# Patient Record
Sex: MALE | Race: WHITE
[De-identification: names, ages, dates, MRNs, and addresses within clinical notes are randomized per-mention and may not be internally consistent; named-entity substitution may affect disease eponyms.]

---

## 2018-05-13 ENCOUNTER — HOSPITAL ENCOUNTER (EMERGENCY)
Dept: HOSPITAL 41 - JD.ED | Age: 58
Discharge: HOME | End: 2018-05-13
Payer: COMMERCIAL

## 2018-05-13 DIAGNOSIS — S67.193A: Primary | ICD-10-CM
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
## 2018-05-13 DIAGNOSIS — Z23: ICD-10-CM

## 2018-05-13 DIAGNOSIS — S61.315A: ICD-10-CM

## 2018-05-13 PROCEDURE — 90715 TDAP VACCINE 7 YRS/> IM: CPT

## 2018-05-13 PROCEDURE — 99283 EMERGENCY DEPT VISIT LOW MDM: CPT

## 2018-05-13 PROCEDURE — 12002 RPR S/N/AX/GEN/TRNK2.6-7.5CM: CPT

## 2018-05-13 PROCEDURE — 73140 X-RAY EXAM OF FINGER(S): CPT

## 2018-05-13 PROCEDURE — 90471 IMMUNIZATION ADMIN: CPT

## 2018-05-13 NOTE — CR
Left third finger: Three views centered to the left third finger were 

obtained.

 

Soft tissue injury is seen distally.  No acute fracture, dislocation 

or other bony abnormality is seen.

 

Impression:

1.  Soft tissue injury.

2.  No acute bony abnormality is identified on left third finger 

study.

 

Diagnostic code #2

## 2018-05-13 NOTE — EDM.PDOC
ED HPI GENERAL MEDICAL PROBLEM





- General


Chief Complaint: Upper Extremity Injury/Pain


Stated Complaint: LEFT FINGER LAC


Time Seen by Provider: 05/13/18 11:35


Source of Information: Reports: Patient, Family (spouse)


History Limitations: Reports: No Limitations





- History of Present Illness


INITIAL COMMENTS - FREE TEXT/NARRATIVE: 





57-year-old male presents the ED with an acute injury to his distal left third 

finger. States he was up on the ladder about 3 rungs working on the [employer] 

when the ladder tipped and collapsed. His left finger became entangled in part 

of the ladder that has a hinged component.The hinge clamp down on the ulnar 

aspect of his distal left third finger with resultant large avulsion of skin 

and subcutaneous tissue down to the bone. Patient has lost partially 3 cm in 

length1.5 cm in width of  tissue.he has full range of motion. The ulnar aspect 

of the lateral nail and nail fold has also been damaged and is missing.he has a 

superficial laceration to the radial aspect of the fourth finger as well as 

well as a deep abrasion to the ulnar aspect which will require some 

debridement. Of note he is right-hand dominant.atient can't remember when he 

had his last tetanus toxoid vaccination.


Onset: Today


Onset Date: 05/13/18


Onset Time: 10:45


Duration: Minutes:


Location: Reports: Upper Extremity, Left (left third finger distally.)


Quality: Reports: Ache


Severity: Moderate


Improves with: Reports: None


Worsens with: Reports: None


Context: Reports: Trauma (inched in the hinge on a hinged ladder.)


Associated Symptoms: Reports: No Other Symptoms


Treatments PTA: Reports: Other (see below)


Other Treatments PTA: pressure dressing


  ** Left 3-Middle finger


Pain Score (Numeric/FACES): 5





- Related Data


 Allergies











Allergy/AdvReac Type Severity Reaction Status Date / Time


 


No Known Allergies Allergy   Verified 05/13/18 11:27











Home Meds: 


 Home Meds





Doxycycline [Vibramycin] 100 mg PO BID #24 tab 05/13/18 [Rx]


oxyCODONE HCl/Acetaminophen [Percocet 5-325 mg Tablet] 1 - 2 each PO Q4H PRN #

24 tablet 05/13/18 [Rx]











Past Medical History





- Past Health History


Medical/Surgical History: Denies Medical/Surgical History





Social & Family History





- Tobacco Use


Smoking Status *Q: Never Smoker





- Caffeine Use


Caffeine Use: Reports: Coffee, Tea





- Recreational Drug Use


Recreational Drug Use: No





- Living Situation & Occupation


Occupation: Employed





Review of Systems





- Review of Systems


Review Of Systems: See Below


Constitutional: Reports: No Symptoms


Eyes: Reports: No Symptoms


Ears: Reports: No Symptoms


Nose: Reports: No Symptoms


Mouth/Throat: Reports: No Symptoms


Respiratory: Reports: No Symptoms


Cardiovascular: Reports: No Symptoms


GI/Abdominal: Reports: No Symptoms


Genitourinary: Reports: No Symptoms


Musculoskeletal: Reports: Joint Pain (ometimes knees and back.)


Skin: Reports: No Symptoms


Neurological: Reports: No Symptoms


Psychiatric: Reports: No Symptoms





ED EXAM, GENERAL





- Physical Exam


Exam: See Below


Exam Limited By: No Limitations


General Appearance: Alert, WD/WN, No Apparent Distress


Extremities: Other (examination was limited primarily to his third distal 

finger. Patient has avulsed an area approximately 3 cm in length and 1-1.5 cm 

in width from the ulnar aspect of the distal finger. It has completely avulsed 

his fingernail.There is also a 3 cm laceration on the radial aspect of the 

distal finger as well. This involves the tip of the finger. There is a large 

amount of tissue that is been completely lost. Bone is exposed.There is no 

doubt that he will have digital nerve damage to the distal aspect of the finger 

on the ulnar aspect.note there is a 1.2 cm superficial laceration on the radial 

aspect of the fourth finger over the middle phalanx that does not require 

suture repair. Wound will  be cleansed with topical antibiotic placement and a 

bandage.)


Neurological: Other (xamination of the left hand reveals a deep avulsion injury 

to the distal aspect of the third fingerulnar only. Is a proximally 3 cm in 

length avulsion with 1-1.5 cm width avulsion of this tissue down to the bone. 

Also the lateral ulnar aspect of the nail and nail fold has been avulsed. There 

is laceration across the distal aspect of the finger below the nailbed as well. 

Also laceration on the radial aspect of the distal phalanx. He has full on full 

range of motion with notes of tendon injury. There will be no doubt that there 

will be digital nerve injury to theulnar aspect of the finger.)


Psychiatric: Normal Affect, Normal Mood


Skin Exam: Warm, Dry.  No: Intact





ED TRAUMA EXTREMITY PROCEDURES





- Laceration/Wound Repair


  ** Left Distal Finger


Lac/Wound Length In cm: 6.0 (patient has a significant crush type injury to the 

distal aspect of his left third finger. He has avulsed approximate 3 cm in 

length by 1-1.5 cm in width of tissue along the ulnar aspect of the distal 

finger that travels down to the middle phalanx.This includes complete avulsion 

of the nail and approximately 25% of the ulnar aspect of the nailbed.There is a 

3 cm laceration along the radial aspect of the distal finger as well.)


Appearance: Subcutaneous ( Bone is exposed.), Clean


Distal NVT: No Tendon Injury, Other (there is no doubt the nerve to the distal 

aspect of the finger will be lost due to the large amount of avulsion of tissue 

and bone exposure.)


Anesthetic Type: Digital


Local Anesthesia - Bupivicaine (Marcaine): 0.5% Plain


Local Anesthetic Volume: Other (8 mL)


Skin Prep: Saline (wound was soaked in saline and 10% Betadine solution.)


Exploration/Debridement/Repair: Wound Explored, Minimal Debridement, Moderately 

Undermined


Closed With: Sutures


Suture Size: 3-0


# of Sutures: 22


Suture Type: Nylon, Interrupted, Simple





Course





- Vital Signs


Last Recorded V/S: 


 Last Vital Signs











Temp  37.2 C   05/13/18 11:29


 


Pulse  103 H  05/13/18 11:29


 


Resp  20   05/13/18 11:29


 


BP  162/86 H  05/13/18 11:29


 


Pulse Ox  99   05/13/18 11:29














- Orders/Labs/Meds


Orders: 


 Active Orders 24 hr











 Category Date Time Status


 


 Vaccines to be Administered [RC] PER UNIT ROUTINE Care  05/13/18 11:44 Active


 


 Fingers Third Digit Lt F2 [CR] Stat Exams  05/13/18 11:35 Taken











Meds: 


Medications














Discontinued Medications














Generic Name Dose Route Start Last Admin





  Trade Name Freq  PRN Reason Stop Dose Admin


 


Amoxicillin/Clavulanate Potassium  1 tab  05/13/18 13:06  05/13/18 13:23





  Augmentin 875 Mg/125 Mg  PO  05/13/18 13:07  1 tab





  ONETIME ONE   Administration





     





     





     





     


 


Bupivacaine HCl  10 ml  05/13/18 11:35  05/13/18 11:40





  Sensorcaine-Mpf 0.5%  INJECT  05/13/18 11:36  10 ml





  ONETIME ONE   Administration





     





     





     





     


 


Diphtheria/Tetanus/Acell Pertussis  0.5 ml  05/13/18 11:44  05/13/18 11:57





  Adacel  IM  05/13/18 11:45  0.5 ml





  .ONCE ONE   Administration





     





     





     





     














- Radiology Interpretation


Free Text/Narrative:: 


57-year-old male presents the ED with an acute injury to the ulnar aspect of 

his distal left third finger. This occurredwhen the ladder that he was on 

collapsed irrigated it is a hinged ladder in his left finger became entangled 

in the hinge component which resulted in a complete avulsion of tissue from the 

ulnar aspect of the distal finger. Length of avulsion is approximately 3 cm 

with a centimeter to centimeter half in width.It is full thickness skin loss 

down to the bone. Plan tetanus diphtheria and pertussis vaccine will be 

updated. Digital block to be performed with report 0.5% Marcainewound will then 

be soaked in 10% Betadine solution. X-ray of the finger to be done. Attempt at 

repair will be carried out utilizing a component of undermining of the tissue 

to see if we can close the wound and cover the bone. TDap will be updated as 

well. 








- Re-Assessments/Exams


Free Text/Narrative Re-Assessment/Exam: 





05/13/18 12:00: digital block performed with 0.5% Marcaine. Resulted 10% 

Betadine solution. X-ray to be done.





05/13/18 12:18  X-rays of the third finger reveal the bone to be intact. Will 

proced with wound repair





05/13/18 13:23  the wounds to the third finger were repaired under digital 

block which worked very well. 3 cm laceration along the radial aspect of the 

finger up to the tip was closed with 30 intermittent nylon sutures. On the 

ulnar aspect of the finger I undermined both edgesand brought it back together 

as closely as possible to cover the bone.The finger and about being quite 

"pointy" due to the amount of soft tissue loss on the distal aspect of the 

finger. However the bone is completely covered. Sutures will need to be removed 

in 12 days time. I will have him follow-up with Dr. Murdock orthopedic surgeonin 

12 days time. Patient will be given Percocet tabs 5/3/25 24 tablets one or 2 

every 4-6 hours for pain management for the next 3-4 days. After this she may 

use Motrin 600 mg every 6 hours when necessary.initial dressing to remain in 

place for the next 2 days and then to be removed with daily wound cleansed and 

topical antibiotic placement.Cover with bandages to keep clean. There is a 

linear superficial laceration on the ulnar aspect of the fourth finger that 

does not require suture repair.He was simply cleansed with topical anabolic and 

bandage placement. Skin was debrided from the radial aspect of the finger as 

well. Patient will receive 875 mg of Augmentin now and then be placed on 

doxycycline 100 mg twice daily for the next 12 days to prevent secondary wound 

infection as the bone was exposed. Follow up if any signs of infection occur.














Departure





- Departure


Time of Disposition: 13:08


Disposition: Home, Self-Care 01


Condition: Fair


Clinical Impression: 


Laceration of finger of left hand with damage to nail


Qualifiers:


 Encounter type: initial encounter Finger: middle finger Foreign body presence: 

without foreign body Qualified Code(s): S61.313A - Laceration without foreign 

body of left middle finger with damage to nail, initial encounter








- Discharge Information


Prescriptions: 


Doxycycline [Vibramycin] 100 mg PO BID #24 tab


oxyCODONE HCl/Acetaminophen [Percocet 5-325 mg Tablet] 1 - 2 each PO Q4H PRN #

24 tablet


 PRN Reason: pain relief. 


Instructions:  Laceration Care, Adult


Referrals: 


PCP,None [Primary Care Provider] - 


Forms:  ED Department Discharge


Additional Instructions: 


evaluation in the emergency room today in regards to crush type injury to the 

ulnar aspect of the distal left third finger. This was caught in the hinge of a 

ladder with avulsion of the side of your finger. Approximate 3 cm in length and 

1-1.5 cm in width. X-rays revealed no bony injuries. Bone was exposed due to 

loss of tissue. Digital block was performed using Marcaine 0.5%.Wound was then 

soaked and cleansed in 10% Betadine solution. Wound was then sutured on both 

sides of the finger 22 sutures in total. Total length of laceration(s) was 6 

centimeters.wound was cleansed and afinger cot dressing will be placed. This 

dressing is to remain in place for the next 48 hours. After this she may remove 

the dressing and then daily cleanse the wound with soap and water either by way 

of showering or soaking for a few minutes in lukewarm water. A drop or 2 of 

dish soap is all that is usually needed to clean the wound. Then topical 

antibiotic such as bacitracin or Polysporin is to be placed on all wounds. 

Dressings are then to be applied to keep the finger covered for at least 5 

days. After this if the wound is not going to get dirty it may leave bandages 

off as it heals better if it's exposed to air. Sugars will need to be removed 

in 12 days time. Suggest follow-up with Dr. Murdock--orthopedic surgeon who works 

on the second floor at the hospital here. Please call [phone] tomorrow to 

arrange an appointment.n the meantime you need take antibiotic doxycycline 1 

tablet twice daily for the next 12 days to prevent secondary wound infection. 

First tablet would be due after supper tonight. Pain medication Percocet 5/325 

milligram tabs one or 2 every 4-6 hours as needed for the next 2-3 days until 

the pain settles down. After this may use Motrin 600 mg every 6 hours as 

needed.as you would discuss the finger will not look normal they will be plenty 

due to the amount of tissue lost from the injury. Has been covered by skin and 

subcutaneous tissue. The wound will probably take anywhere from 21-30 days to 

heal completely.





- My Orders


Last 24 Hours: 


My Active Orders





05/13/18 11:35


Fingers Third Digit Lt F2 [CR] Stat 





05/13/18 11:44


Vaccines to be Administered [RC] PER UNIT ROUTINE 














- Assessment/Plan


Last 24 Hours: 


My Active Orders





05/13/18 11:35


Fingers Third Digit Lt F2 [CR] Stat 





05/13/18 11:44


Vaccines to be Administered [RC] PER UNIT ROUTINE